# Patient Record
Sex: MALE | Race: WHITE
[De-identification: names, ages, dates, MRNs, and addresses within clinical notes are randomized per-mention and may not be internally consistent; named-entity substitution may affect disease eponyms.]

---

## 2018-05-23 ENCOUNTER — HOSPITAL ENCOUNTER (EMERGENCY)
Dept: HOSPITAL 88 - ER | Age: 68
Discharge: HOME | End: 2018-05-23
Payer: COMMERCIAL

## 2018-05-23 VITALS — BODY MASS INDEX: 29.9 KG/M2 | HEIGHT: 74 IN | WEIGHT: 233 LBS

## 2018-05-23 VITALS — SYSTOLIC BLOOD PRESSURE: 129 MMHG | DIASTOLIC BLOOD PRESSURE: 78 MMHG

## 2018-05-23 DIAGNOSIS — J20.9: ICD-10-CM

## 2018-05-23 DIAGNOSIS — R05: ICD-10-CM

## 2018-05-23 DIAGNOSIS — R50.9: Primary | ICD-10-CM

## 2018-05-23 DIAGNOSIS — I10: ICD-10-CM

## 2018-05-23 LAB
ALBUMIN SERPL-MCNC: 3.6 G/DL (ref 3.5–5)
ALBUMIN/GLOB SERPL: 1.1 {RATIO} (ref 0.8–2)
ALP SERPL-CCNC: 55 IU/L (ref 40–150)
ALT SERPL-CCNC: 9 IU/L (ref 0–55)
ANION GAP SERPL CALC-SCNC: 13.1 MMOL/L (ref 8–16)
BACTERIA URNS QL MICRO: (no result) /HPF
BASOPHILS # BLD AUTO: 0 10*3/UL (ref 0–0.1)
BASOPHILS NFR BLD AUTO: 0.4 % (ref 0–1)
BILIRUB UR QL: NEGATIVE
BUN SERPL-MCNC: 15 MG/DL (ref 7–26)
BUN/CREAT SERPL: 12 (ref 6–25)
CALCIUM SERPL-MCNC: 9.4 MG/DL (ref 8.4–10.2)
CHLORIDE SERPL-SCNC: 102 MMOL/L (ref 98–107)
CLARITY UR: CLEAR
CO2 SERPL-SCNC: 25 MMOL/L (ref 22–29)
COLOR UR: YELLOW
DEPRECATED NEUTROPHILS # BLD AUTO: 4.7 10*3/UL (ref 2.1–6.9)
DEPRECATED RBC URNS MANUAL-ACNC: (no result) /HPF (ref 0–5)
EGFRCR SERPLBLD CKD-EPI 2021: 56 ML/MIN (ref 60–?)
EOSINOPHIL # BLD AUTO: 0 10*3/UL (ref 0–0.4)
EOSINOPHIL NFR BLD AUTO: 0.4 % (ref 0–6)
EPI CELLS URNS QL MICRO: (no result) /LPF
ERYTHROCYTE [DISTWIDTH] IN CORD BLOOD: 13.4 % (ref 11.7–14.4)
FLUAV + FLUBV AG SPEC IF: NEGATIVE
GLOBULIN PLAS-MCNC: 3.3 G/DL (ref 2.3–3.5)
GLUCOSE SERPLBLD-MCNC: 167 MG/DL (ref 74–118)
HCT VFR BLD AUTO: 36.8 % (ref 38.2–49.6)
HGB BLD-MCNC: 12.3 G/DL (ref 14–18)
KETONES UR QL STRIP.AUTO: NEGATIVE
LEUKOCYTE ESTERASE UR QL STRIP.AUTO: NEGATIVE
LYMPHOCYTES # BLD: 1.3 10*3/UL (ref 1–3.2)
LYMPHOCYTES NFR BLD AUTO: 18.8 % (ref 18–39.1)
MCH RBC QN AUTO: 30.4 PG (ref 28–32)
MCHC RBC AUTO-ENTMCNC: 33.4 G/DL (ref 31–35)
MCV RBC AUTO: 90.9 FL (ref 81–99)
MONOCYTES # BLD AUTO: 0.7 10*3/UL (ref 0.2–0.8)
MONOCYTES NFR BLD AUTO: 10.9 % (ref 4.4–11.3)
NEUTS SEG NFR BLD AUTO: 69.2 % (ref 38.7–80)
NITRITE UR QL STRIP.AUTO: NEGATIVE
PLATELET # BLD AUTO: 170 X10E3/UL (ref 140–360)
POTASSIUM SERPL-SCNC: 4.1 MMOL/L (ref 3.5–5.1)
PROT UR QL STRIP.AUTO: NEGATIVE
RBC # BLD AUTO: 4.05 X10E6/UL (ref 4.3–5.7)
S PYO AG THROAT QL: NEGATIVE
SODIUM SERPL-SCNC: 136 MMOL/L (ref 136–145)
SP GR UR STRIP: 1.02 (ref 1.01–1.02)
UROBILINOGEN UR STRIP-MCNC: 0.2 MG/DL (ref 0.2–1)
WBC #/AREA URNS HPF: (no result) /HPF (ref 0–5)

## 2018-05-23 PROCEDURE — 36415 COLL VENOUS BLD VENIPUNCTURE: CPT

## 2018-05-23 PROCEDURE — 85025 COMPLETE CBC W/AUTO DIFF WBC: CPT

## 2018-05-23 PROCEDURE — 83518 IMMUNOASSAY DIPSTICK: CPT

## 2018-05-23 PROCEDURE — 99284 EMERGENCY DEPT VISIT MOD MDM: CPT

## 2018-05-23 PROCEDURE — 71045 X-RAY EXAM CHEST 1 VIEW: CPT

## 2018-05-23 PROCEDURE — 81001 URINALYSIS AUTO W/SCOPE: CPT

## 2018-05-23 PROCEDURE — 87400 INFLUENZA A/B EACH AG IA: CPT

## 2018-05-23 PROCEDURE — 87040 BLOOD CULTURE FOR BACTERIA: CPT

## 2018-05-23 PROCEDURE — 80053 COMPREHEN METABOLIC PANEL: CPT

## 2018-05-23 PROCEDURE — 87086 URINE CULTURE/COLONY COUNT: CPT

## 2018-05-23 PROCEDURE — 87070 CULTURE OTHR SPECIMN AEROBIC: CPT

## 2018-05-23 NOTE — DIAGNOSTIC IMAGING REPORT
PROCEDURE: CHEST SINGLE (PORTABLE)

COMPARISON: None.

INDICATIONS: COUGH, FEVER

 

FINDINGS:



LUNGS: No consolidations or edema.

 

PLEURA: No effusions or pneumothorax.

 

HEART \T\ 

MEDIASTINUM: The heart is within normal size-limits.

 

BONES \T\

SOFT TISSUES:  No acute findings.

 

 

CONCLUSION:

No acute thoracic abnormality. 

 

 

Dictated by:  Jenaro Gaytan M.D. on 5/23/2018 at 9:19     

Electronically approved by:  Jenaro Gaytan M.D. on 5/23/2018 at 9:19

## 2022-05-17 LAB
BASOPHILS # BLD AUTO: 0.1 10*3/UL (ref 0–0.1)
BASOPHILS NFR BLD AUTO: 1.2 % (ref 0–1)
DEPRECATED NEUTROPHILS # BLD AUTO: 3.7 10*3/UL (ref 2.1–6.9)
EOSINOPHIL # BLD AUTO: 0.5 10*3/UL (ref 0–0.4)
EOSINOPHIL NFR BLD AUTO: 6.6 % (ref 0–6)
ERYTHROCYTE [DISTWIDTH] IN CORD BLOOD: 12.2 % (ref 11.7–14.4)
HCT VFR BLD AUTO: 38.1 % (ref 38.2–49.6)
HGB BLD-MCNC: 12.1 G/DL (ref 14–18)
LYMPHOCYTES # BLD: 1.9 10*3/UL (ref 1–3.2)
LYMPHOCYTES NFR BLD AUTO: 28.3 % (ref 18–39.1)
MCH RBC QN AUTO: 31.3 PG (ref 28–32)
MCHC RBC AUTO-ENTMCNC: 31.8 G/DL (ref 31–35)
MCV RBC AUTO: 98.7 FL (ref 81–99)
MONOCYTES # BLD AUTO: 0.6 10*3/UL (ref 0.2–0.8)
MONOCYTES NFR BLD AUTO: 9.4 % (ref 4.4–11.3)
NEUTS SEG NFR BLD AUTO: 54.1 % (ref 38.7–80)
PLATELET # BLD AUTO: 181 X10E3/UL (ref 140–360)
RBC # BLD AUTO: 3.86 X10E6/UL (ref 4.3–5.7)

## 2022-05-19 ENCOUNTER — HOSPITAL ENCOUNTER (OUTPATIENT)
Dept: HOSPITAL 88 - OR | Age: 72
Discharge: HOME | End: 2022-05-19
Attending: PLASTIC SURGERY
Payer: MEDICARE

## 2022-05-19 VITALS — DIASTOLIC BLOOD PRESSURE: 61 MMHG | SYSTOLIC BLOOD PRESSURE: 126 MMHG

## 2022-05-19 DIAGNOSIS — D18.1: Primary | ICD-10-CM

## 2022-05-19 DIAGNOSIS — Z01.810: ICD-10-CM

## 2022-05-19 DIAGNOSIS — K51.90: ICD-10-CM

## 2022-05-19 DIAGNOSIS — Z79.899: ICD-10-CM

## 2022-05-19 DIAGNOSIS — Z85.46: ICD-10-CM

## 2022-05-19 DIAGNOSIS — E11.9: ICD-10-CM

## 2022-05-19 DIAGNOSIS — I10: ICD-10-CM

## 2022-05-19 DIAGNOSIS — G47.33: ICD-10-CM

## 2022-05-19 DIAGNOSIS — Z79.84: ICD-10-CM

## 2022-05-19 DIAGNOSIS — Z01.812: ICD-10-CM

## 2022-05-19 PROCEDURE — 82948 REAGENT STRIP/BLOOD GLUCOSE: CPT

## 2022-05-19 PROCEDURE — 11442 EXC FACE-MM B9+MARG 1.1-2 CM: CPT

## 2022-05-19 PROCEDURE — 88305 TISSUE EXAM BY PATHOLOGIST: CPT

## 2022-05-19 PROCEDURE — 36415 COLL VENOUS BLD VENIPUNCTURE: CPT

## 2022-05-19 PROCEDURE — 93005 ELECTROCARDIOGRAM TRACING: CPT

## 2022-05-19 PROCEDURE — 85025 COMPLETE CBC W/AUTO DIFF WBC: CPT

## 2022-05-19 PROCEDURE — 88304 TISSUE EXAM BY PATHOLOGIST: CPT
